# Patient Record
Sex: MALE
[De-identification: names, ages, dates, MRNs, and addresses within clinical notes are randomized per-mention and may not be internally consistent; named-entity substitution may affect disease eponyms.]

---

## 2023-08-21 ENCOUNTER — APPOINTMENT (OUTPATIENT)
Dept: ULTRASOUND IMAGING | Facility: HOSPITAL | Age: 23
End: 2023-08-21

## 2023-08-21 PROBLEM — Z00.00 ENCOUNTER FOR PREVENTIVE HEALTH EXAMINATION: Status: ACTIVE | Noted: 2023-08-21

## 2023-08-24 ENCOUNTER — EMERGENCY (EMERGENCY)
Facility: HOSPITAL | Age: 23
LOS: 1 days | Discharge: HOME | End: 2023-08-24
Attending: EMERGENCY MEDICINE | Admitting: EMERGENCY MEDICINE
Payer: MEDICAID

## 2023-08-24 VITALS
OXYGEN SATURATION: 97 % | DIASTOLIC BLOOD PRESSURE: 77 MMHG | SYSTOLIC BLOOD PRESSURE: 119 MMHG | WEIGHT: 158.73 LBS | RESPIRATION RATE: 17 BRPM | TEMPERATURE: 98 F | HEART RATE: 78 BPM

## 2023-08-24 VITALS
TEMPERATURE: 98 F | SYSTOLIC BLOOD PRESSURE: 138 MMHG | DIASTOLIC BLOOD PRESSURE: 84 MMHG | RESPIRATION RATE: 17 BRPM | OXYGEN SATURATION: 98 % | HEART RATE: 84 BPM

## 2023-08-24 DIAGNOSIS — N50.812 LEFT TESTICULAR PAIN: ICD-10-CM

## 2023-08-24 LAB
APPEARANCE UR: CLEAR — SIGNIFICANT CHANGE UP
BILIRUB UR-MCNC: NEGATIVE — SIGNIFICANT CHANGE UP
COLOR SPEC: YELLOW — SIGNIFICANT CHANGE UP
DIFF PNL FLD: NEGATIVE — SIGNIFICANT CHANGE UP
GLUCOSE UR QL: NEGATIVE — SIGNIFICANT CHANGE UP
KETONES UR-MCNC: NEGATIVE — SIGNIFICANT CHANGE UP
LEUKOCYTE ESTERASE UR-ACNC: NEGATIVE — SIGNIFICANT CHANGE UP
NITRITE UR-MCNC: NEGATIVE — SIGNIFICANT CHANGE UP
PH UR: 6.5 — SIGNIFICANT CHANGE UP (ref 5–8)
PROT UR-MCNC: NEGATIVE MG/DL — SIGNIFICANT CHANGE UP
SP GR SPEC: 1.01 — SIGNIFICANT CHANGE UP (ref 1–1.03)
UROBILINOGEN FLD QL: 0.2 E.U./DL — SIGNIFICANT CHANGE UP

## 2023-08-24 PROCEDURE — 87491 CHLMYD TRACH DNA AMP PROBE: CPT

## 2023-08-24 PROCEDURE — 76870 US EXAM SCROTUM: CPT | Mod: 26

## 2023-08-24 PROCEDURE — 99284 EMERGENCY DEPT VISIT MOD MDM: CPT

## 2023-08-24 PROCEDURE — 99284 EMERGENCY DEPT VISIT MOD MDM: CPT | Mod: 25

## 2023-08-24 PROCEDURE — 81003 URINALYSIS AUTO W/O SCOPE: CPT

## 2023-08-24 PROCEDURE — 87591 N.GONORRHOEAE DNA AMP PROB: CPT

## 2023-08-24 PROCEDURE — 76870 US EXAM SCROTUM: CPT

## 2023-08-24 NOTE — ED PROVIDER NOTE - CLINICAL SUMMARY MEDICAL DECISION MAKING FREE TEXT BOX
left testicular pain x 1 week. no visible abnormality but feels some swelling in area. unable to palpate hernia. ua/gc sent. us ordered r/o mass, epididymitis/ orchitis. already on doxycycline x 3 days.   ua neg. us neg.  will continue antibiotics. f/u urology. return precautions discussed

## 2023-08-24 NOTE — ED PROVIDER NOTE - GENITOURINARY, MLM
No discharge, lesions. left testicle normal appearance without visible redness/swelling. no palpable masses or hernia. no rash. no lymphadenopathy

## 2023-08-24 NOTE — ED PROVIDER NOTE - PATIENT PORTAL LINK FT
You can access the FollowMyHealth Patient Portal offered by [facility] by registering at the following website: [email or web address]. By joining [employer]’s FollowMyHealth portal, you will also be able to view your health information using other applications (apps) compatible with our system. You can access the FollowMyHealth Patient Portal offered by [facility] by registering at the following website: [email or web address]. By joining [employer]’s FollowMyHealth portal, you will also be able to view your health information using other applications (apps) compatible with our system. You can access the FollowMyHealth Patient Portal offered by [facility] by registering at the following website: [email or web address]. By joining [employer]’s FollowMyHealth portal, you will also be able to view your health information using other applications (apps) compatible with our system.

## 2023-08-24 NOTE — ED PROVIDER NOTE - OBJECTIVE STATEMENT
denies pmh, here with left testicular pain/swelling x 1 wk or more. Denies trauma, fever, chills, dysuria, discharge. Had urine culture/ gc/chlamydia test at school that was negative. [place] on doxycycline bid 3 days ago. Feels like it may have helped a little bit. Sexually active 1 partner no sti history.

## 2023-08-24 NOTE — ED ADULT NURSE NOTE - CHIEF COMPLAINT
Costochondritis - inflammation of the junctions of ribs and cartilages
The patient is a 23y Male complaining of

## 2023-08-24 NOTE — ED PROVIDER NOTE - CARE PROVIDER_API CALL
Lissett Whitaker  Urology  [address] [place] [id number]  Phone: [phone]  Fax: [phone]  Follow Up Time:    Lissett Whitaker  Urology  [address] [place] [address]  Phone: [phone]  Fax: [phone]  Follow Up Time:    Lissett Whitaker  Urology  [address] [place] [id number]  Phone: [phone]  Fax: [phone]  Follow Up Time:

## 2023-08-24 NOTE — ED ADULT TRIAGE NOTE - CHIEF COMPLAINT QUOTE
Pt c/o L testicular pain radiating to LLQ, swelling x 1 week. Pt denies discoloration, difficulty or pain with urination, fevers, trauma. No pmh.

## 2023-08-24 NOTE — ED PROVIDER NOTE - PROVIDER TOKENS
PROVIDER:[TOKEN:[[id number]]] PROVIDER:[TOKEN:[[id number]]] PROVIDER:[TOKEN:[[id number]]]

## 2023-08-24 NOTE — ED PROVIDER NOTE - NSFOLLOWUPINSTRUCTIONS_ED_ALL_ED_FT
Finish your antibiotics as previously prescribed. Please see referral to urologist for followup.  Call for appointment.  If you have any problems with followup, please call the ED Referral Coordinator at [phone].  Return to the ER if symptoms worsen or other concerns.    Scrotal Pain    WHAT YOU NEED TO KNOW:    What do I need to know about scrotal pain? Scrotal pain can happen at any age. The cause of scrotal pain can range from a minor injury to a serious medical condition. It is very important to seek immediate care if you have scrotal pain. The pain may be a warning sign of a serious condition that will need treatment. Without immediate care, you may be at increased risk for losing a testicle or being sterile (not having children).    What may cause scrotal pain?    Torsion (twisting) of the testicle, cord that carries sperm from the testicle, or tissue attached to the testicle    An infection of the testicle or other area in the scrotum    A hydrocele (fluid buildup around the testicle) or varicocele (blood backup in veins in the scrotum)    An inguinal hernia (tissue pushed out of place in your groin)    Herbert gangrene (tissue death of the area between the scrotum and anus)    A urinary tract infection or stone that is passing, or an infected appendix    An injury in your groin or scrotum  What are the warning signs of a serious medical problem? Seek care immediately if you have any of the following:    Pain that starts suddenly or is severe    Swelling in your scrotum or groin, especially if you also have severe pain or are vomiting    Red or black patches of skin on your scrotum or area between your penis and anus    Blisters anywhere in your groin or scrotum    A fever  How is the cause of scrotal pain diagnosed? Your healthcare provider will examine you and ask about your pain. Tell the provider when the pain started and how long it lasts. Your provider will ask if pain started in another area and moved to your scrotum. The pain may also have moved from your scrotum to another area. Tell your provider if you have pain during exercise or if you had an injury to your groin. Also tell your provider if you have any problems urinating or if any discharge came out of your penis. Your provider may also ask about your sexual activity.    Blood tests may be used to check for signs of infection.    Ultrasound pictures may show a problem with your testicles or tissues in your scrotum. An ultrasound may also show kidney stones or other problems that may be causing your pain.  How is scrotal pain treated? Treatment will depend on the cause of your pain:    Prescription pain medicine may be given. Ask your healthcare provider how to take this medicine safely. Some prescription pain medicines contain acetaminophen. Do not take other medicines that contain acetaminophen without talking to your healthcare provider. Too much acetaminophen may cause liver damage. Prescription pain medicine may cause constipation. Ask your healthcare provider how to prevent or treat constipation.    NSAIDs, such as ibuprofen, help decrease swelling, pain, and fever. This medicine is available with or without a doctor's order. NSAIDs can cause stomach bleeding or kidney problems in certain people. If you take blood thinner medicine, always ask your healthcare provider if NSAIDs are safe for you. Always read the medicine label and follow directions.    Antibiotics are used to treat a bacterial infection.    Surgery may be needed to untwist the testicle, or cord, or to remove dead or infected tissue.  What can I do to manage my symptoms?    Wear a support device, if directed. A support device, such as a jock strap, can help keep your scrotum lifted and supported. This can help decrease pain.    Apply ice to your scrotum. Ice helps decrease pain and swelling. Use an ice pack, or put crushed ice in a plastic bag. Cover the pack or bag with a towel before you apply it to your scrotum. Apply ice for 15 to 20 minutes every hour, or as directed.  When should I seek immediate care?    You have any warning signs of a serious problem.    You have pain or swelling that starts or gets worse quickly.    You have skin changes in your scrotum, such as a dark patch.    You have a fever.  When should I contact my healthcare provider?    Your pain does not get better, even after you take pain medicine.    You have new or worsening pain.    You have questions or concerns about your condition or care.  CARE AGREEMENT:    You have the right to help plan your care. Learn about your health condition and how it may be treated. Discuss treatment options with your healthcare providers to decide what care you want to receive. You always have the right to refuse treatment. Finish your antibiotics as previously prescribed. Please see referral to urologist for followup.  Call for appointment.  If you have any problems with followup, please call the ED Referral Coordinator at [phone].  Return to the ER if symptoms worsen or other concerns.    Scrotal Pain    WHAT YOU NEED TO KNOW:    What do I need to know about scrotal pain? Scrotal pain can happen at any age. The cause of scrotal pain can range from a minor injury to a serious medical condition. It is very important to seek immediate care if you have scrotal pain. The pain may be a warning sign of a serious condition that will need treatment. Without immediate care, you may be at increased risk for losing a testicle or being sterile (not having children).    What may cause scrotal pain?    Torsion (twisting) of the testicle, cord that carries sperm from the testicle, or tissue attached to the testicle    An infection of the testicle or other area in the scrotum    A hydrocele (fluid buildup around the testicle) or varicocele (blood backup in veins in the scrotum)    An inguinal hernia (tissue pushed out of place in your groin)    Herbert gangrene (tissue death of the area between the scrotum and anus)    A urinary tract infection or stone that is passing, or an infected appendix    An injury in your groin or scrotum  What are the warning signs of a serious medical problem? Seek care immediately if you have any of the following:    Pain that starts suddenly or is severe    Swelling in your scrotum or groin, especially if you also have severe pain or are vomiting    Red or black patches of skin on your scrotum or area between your penis and anus    Blisters anywhere in your groin or scrotum    A fever  How is the cause of scrotal pain diagnosed? Your healthcare provider will examine you and ask about your pain. Tell the provider when the pain started and how long it lasts. Your provider will ask if pain started in another area and moved to your scrotum. The pain may also have moved from your scrotum to another area. Tell your provider if you have pain during exercise or if you had an injury to your groin. Also tell your provider if you have any problems urinating or if any discharge came out of your penis. Your provider may also ask about your sexual activity.    Blood tests may be used to check for signs of infection.    Ultrasound pictures may show a problem with your testicles or tissues in your scrotum. An ultrasound may also show kidney stones or other problems that may be causing your pain.  How is scrotal pain treated? Treatment will depend on the cause of your pain:    Prescription pain medicine may be given. Ask your healthcare provider how to take this medicine safely. Some prescription pain medicines contain acetaminophen. Do not take other medicines that contain acetaminophen without talking to your healthcare provider. Too much acetaminophen may cause liver damage. Prescription pain medicine may cause constipation. Ask your healthcare provider how to prevent or treat constipation.    NSAIDs, such as ibuprofen, help decrease swelling, pain, and fever. This medicine is available with or without a doctor's order. NSAIDs can cause stomach bleeding or kidney problems in certain people. If you take blood thinner medicine, always ask your healthcare provider if NSAIDs are safe for you. Always read the medicine label and follow directions.    Antibiotics are used to treat a bacterial infection.    Surgery may be needed to untwist the testicle, or cord, or to remove dead or infected tissue.  What can I do to manage my symptoms?    Wear a support device, if directed. A support device, such as a jock strap, can help keep your scrotum lifted and supported. This can help decrease pain.    Apply ice to your scrotum. Ice helps decrease pain and swelling. Use an ice pack, or put crushed ice in a plastic bag. Cover the pack or bag with a towel before you apply it to your scrotum. Apply ice for 15 to 20 minutes every hour, or as directed.  When should I seek immediate care?    You have any warning signs of a serious problem.    You have pain or swelling that starts or gets worse quickly.    You have skin changes in your scrotum, such as a dark patch.    You have a fever.  When should I contact my healthcare provider?    Your pain does not get better, even after you take pain medicine.    You have new or worsening pain.    You have questions or concerns about your condition or care.  CARE AGREEMENT:    You have the right to help plan your care. Learn about your health condition and how it may be treated. Discuss treatment options with your healthcare providers to decide what care you want to receive. You always have the right to refuse treatment. Finish your antibiotics as previously prescribed. Please see referral to urologist for followup.  Call for appointment.  If you have any problems with followup, please call the ED Referral Coordinator at [phone].  Return to the ER if symptoms worsen or other concerns.    Scrotal Pain    WHAT YOU NEED TO KNOW:    What do I need to know about scrotal pain? Scrotal pain can happen at any age. The cause of scrotal pain can range from a minor injury to a serious medical condition. It is very important to seek immediate care if you have scrotal pain. The pain may be a warning sign of a serious condition that will need treatment. Without immediate care, you may be at increased risk for losing a testicle or being sterile (not having children).    What may cause scrotal pain?    Torsion (twisting) of the testicle, cord that carries sperm from the testicle, or tissue attached to the testicle    An infection of the testicle or other area in the scrotum    A hydrocele (fluid buildup around the testicle) or varicocele (blood backup in veins in the scrotum)    An inguinal hernia (tissue pushed out of place in your groin)    Herbert gangrene (tissue death of the area between the scrotum and anus)    A urinary tract infection or stone that is passing, or an infected appendix    An injury in your groin or scrotum  What are the warning signs of a serious medical problem? Seek care immediately if you have any of the following:    Pain that starts suddenly or is severe    Swelling in your scrotum or groin, especially if you also have severe pain or are vomiting    Red or black patches of skin on your scrotum or area between your penis and anus    Blisters anywhere in your groin or scrotum    A fever  How is the cause of scrotal pain diagnosed? Your healthcare provider will examine you and ask about your pain. Tell the provider when the pain started and how long it lasts. Your provider will ask if pain started in another area and moved to your scrotum. The pain may also have moved from your scrotum to another area. Tell your provider if you have pain during exercise or if you had an injury to your groin. Also tell your provider if you have any problems urinating or if any discharge came out of your penis. Your provider may also ask about your sexual activity.    Blood tests may be used to check for signs of infection.    Ultrasound pictures may show a problem with your testicles or tissues in your scrotum. An ultrasound may also show kidney stones or other problems that may be causing your pain.  How is scrotal pain treated? Treatment will depend on the cause of your pain:    Prescription pain medicine may be given. Ask your healthcare provider how to take this medicine safely. Some prescription pain medicines contain acetaminophen. Do not take other medicines that contain acetaminophen without talking to your healthcare provider. Too much acetaminophen may cause liver damage. Prescription pain medicine may cause constipation. Ask your healthcare provider how to prevent or treat constipation.    NSAIDs, such as ibuprofen, help decrease swelling, pain, and fever. This medicine is available with or without a doctor's order. NSAIDs can cause stomach bleeding or kidney problems in certain people. If you take blood thinner medicine, always ask your healthcare provider if NSAIDs are safe for you. Always read the medicine label and follow directions.    Antibiotics are used to treat a bacterial infection.    Surgery may be needed to untwist the testicle, or cord, or to remove dead or infected tissue.  What can I do to manage my symptoms?    Wear a support device, if directed. A support device, such as a jock strap, can help keep your scrotum lifted and supported. This can help decrease pain.    Apply ice to your scrotum. Ice helps decrease pain and swelling. Use an ice pack, or put crushed ice in a plastic bag. Cover the pack or bag with a towel before you apply it to your scrotum. Apply ice for 15 to 20 minutes every hour, or as directed.  When should I seek immediate care?    You have any warning signs of a serious problem.    You have pain or swelling that starts or gets worse quickly.    You have skin changes in your scrotum, such as a dark patch.    You have a fever.  When should I contact my healthcare provider?    Your pain does not get better, even after you take pain medicine.    You have new or worsening pain.    You have questions or concerns about your condition or care.  CARE AGREEMENT:    You have the right to help plan your care. Learn about your health condition and how it may be treated. Discuss treatment options with your healthcare providers to decide what care you want to receive. You always have the right to refuse treatment.

## 2023-08-24 NOTE — ED ADULT NURSE NOTE - NSFALLUNIVINTERV_ED_ALL_ED
Bed/Stretcher in lowest position, wheels locked, appropriate side rails in place/Call bell, personal items and telephone in reach/Instruct patient to call for assistance before getting out of bed/chair/stretcher/Non-slip footwear applied when patient is off stretcher/[facility] to call system/Physically safe environment - no spills, clutter or unnecessary equipment/Purposeful proactive rounding/Room/bathroom lighting operational, light cord in reach Bed/Stretcher in lowest position, wheels locked, appropriate side rails in place/Call bell, personal items and telephone in reach/Instruct patient to call for assistance before getting out of bed/chair/stretcher/Non-slip footwear applied when patient is off stretcher/[place] to call system/Physically safe environment - no spills, clutter or unnecessary equipment/Purposeful proactive rounding/Room/bathroom lighting operational, light cord in reach Bed/Stretcher in lowest position, wheels locked, appropriate side rails in place/Call bell, personal items and telephone in reach/Instruct patient to call for assistance before getting out of bed/chair/stretcher/Non-slip footwear applied when patient is off stretcher/[place] to call system/Physically safe environment - no spills, clutter or unnecessary equipment/Purposeful proactive rounding/Room/bathroom lighting operational, light cord in reach

## 2023-08-24 NOTE — ED ADULT NURSE NOTE - OBJECTIVE STATEMENT
23 y.o male c/o L testicular swelling, pain radiating to LLQ x 1 week. Pt denies urinary complaints, discoloration. No pmh.

## 2023-08-25 LAB
C TRACH RRNA SPEC QL NAA+PROBE: SIGNIFICANT CHANGE UP
N GONORRHOEA RRNA SPEC QL NAA+PROBE: SIGNIFICANT CHANGE UP

## 2024-10-07 NOTE — ED PROVIDER NOTE - TEMPLATE, MLM
Medication: metoPROLOL succinate (TOPROL-XL) 50 MG 24 hr tablet  passed protocol.   Last office visit date: 9/5/24  Next appointment scheduled?: No   Number of refills given: 1    
General